# Patient Record
Sex: FEMALE | Race: BLACK OR AFRICAN AMERICAN | Employment: OTHER | URBAN - METROPOLITAN AREA
[De-identification: names, ages, dates, MRNs, and addresses within clinical notes are randomized per-mention and may not be internally consistent; named-entity substitution may affect disease eponyms.]

---

## 2024-11-12 ENCOUNTER — HOSPITAL ENCOUNTER (OUTPATIENT)
Dept: CT IMAGING | Facility: HOSPITAL | Age: 89
Discharge: HOME | End: 2024-11-12
Attending: FAMILY MEDICINE
Payer: MEDICARE

## 2024-11-12 DIAGNOSIS — K76.89 LIVER NODULE: ICD-10-CM

## 2024-11-12 LAB
CREAT BLD-MCNC: 1.3 MG/DL
EGFRCR SERPLBLD CKD-EPI 2021: 40 ML/MIN/1.73M2 (ref 60–?)

## 2024-11-12 PROCEDURE — 82565 ASSAY OF CREATININE: CPT

## 2024-11-12 PROCEDURE — 74160 CT ABDOMEN W/CONTRAST: CPT | Performed by: FAMILY MEDICINE

## 2025-03-24 ENCOUNTER — OFFICE VISIT (OUTPATIENT)
Dept: PULMONOLOGY | Facility: CLINIC | Age: 89
End: 2025-03-24

## 2025-03-24 VITALS
WEIGHT: 119 LBS | BODY MASS INDEX: 16.3 KG/M2 | HEIGHT: 71.5 IN | SYSTOLIC BLOOD PRESSURE: 114 MMHG | HEART RATE: 89 BPM | OXYGEN SATURATION: 97 % | DIASTOLIC BLOOD PRESSURE: 60 MMHG

## 2025-03-24 DIAGNOSIS — R91.1 LUNG NODULE: ICD-10-CM

## 2025-03-24 DIAGNOSIS — J44.9 CHRONIC OBSTRUCTIVE PULMONARY DISEASE, UNSPECIFIED COPD TYPE (HCC): Primary | ICD-10-CM

## 2025-03-24 PROBLEM — N18.30 CKD (CHRONIC KIDNEY DISEASE) STAGE 3, GFR 30-59 ML/MIN (HCC): Chronic | Status: ACTIVE | Noted: 2025-03-24

## 2025-03-24 PROCEDURE — 1159F MED LIST DOCD IN RCRD: CPT | Performed by: INTERNAL MEDICINE

## 2025-03-24 PROCEDURE — 1160F RVW MEDS BY RX/DR IN RCRD: CPT | Performed by: INTERNAL MEDICINE

## 2025-03-24 PROCEDURE — 99204 OFFICE O/P NEW MOD 45 MIN: CPT | Performed by: INTERNAL MEDICINE

## 2025-03-24 PROCEDURE — 3074F SYST BP LT 130 MM HG: CPT | Performed by: INTERNAL MEDICINE

## 2025-03-24 PROCEDURE — 1125F AMNT PAIN NOTED PAIN PRSNT: CPT | Performed by: INTERNAL MEDICINE

## 2025-03-24 PROCEDURE — 3078F DIAST BP <80 MM HG: CPT | Performed by: INTERNAL MEDICINE

## 2025-03-24 PROCEDURE — 3008F BODY MASS INDEX DOCD: CPT | Performed by: INTERNAL MEDICINE

## 2025-03-24 RX ORDER — AZITHROMYCIN 250 MG/1
250 TABLET, FILM COATED ORAL DAILY
COMMUNITY

## 2025-03-24 RX ORDER — MEMANTINE HYDROCHLORIDE 14 MG/1
1 CAPSULE, EXTENDED RELEASE ORAL DAILY
COMMUNITY

## 2025-03-24 RX ORDER — LISINOPRIL 5 MG/1
5 TABLET ORAL DAILY
COMMUNITY

## 2025-03-24 RX ORDER — IPRATROPIUM BROMIDE AND ALBUTEROL SULFATE 2.5; .5 MG/3ML; MG/3ML
3 SOLUTION RESPIRATORY (INHALATION) EVERY 12 HOURS
Qty: 120 ML | Refills: 1 | Status: SHIPPED | OUTPATIENT
Start: 2025-03-24

## 2025-03-24 RX ORDER — SERTRALINE HYDROCHLORIDE 25 MG/1
25 TABLET, FILM COATED ORAL DAILY
COMMUNITY

## 2025-03-24 RX ORDER — ALBUTEROL SULFATE 90 UG/1
1 INHALANT RESPIRATORY (INHALATION) EVERY 6 HOURS PRN
COMMUNITY

## 2025-03-24 RX ORDER — FLUTICASONE FUROATE, UMECLIDINIUM BROMIDE AND VILANTEROL TRIFENATATE 100; 62.5; 25 UG/1; UG/1; UG/1
1 POWDER RESPIRATORY (INHALATION) DAILY
Qty: 1 EACH | Refills: 5 | Status: SHIPPED | OUTPATIENT
Start: 2025-03-24

## 2025-03-24 NOTE — PROGRESS NOTES
Signed RUSSELL faxed to [facility] (fx# [phone]) requesting report of recent PET scan. RUSSELL sent to scanning.    Order for nebulizer machine and supplies submitted to [facility] via [place].

## 2025-03-24 NOTE — H&P
[facility]    Pulmonary consult     Alicia Fuentes Patient Status:  Specimen    1935 MRN [id number]   Location [facility] Attending No att. providers found   Hosp Day # 0 PCP Amelie Martinez DO     Date:  3/24/2025    History provided by:patient and daughter  HPI:     Chief Complaint   Patient presents with    Consult     Patient has had cough for 2 weeks     HPI    89-year-old female with history of COPD, lung cancer NSCCA RUL s/p lobectomy in  followed by chemo and radiation therapy and she underwent bronchoscopy with EBUS in  and it was negative after chest CT showed a mass  CT-guided biopsy in  reported negative and completed SBRT in 2019   Followed by pulmonary and oncology at [place] with complex lung disease and many follow-up CTs  She had chest CT in 2024 with also suspicious nodule in the left lung  Patient had recent PET scan outside facility reported negative and is not available to me.    Patient also with progressive dementia, COPD, hypertension, failure to thrive  Came with her daughter with very poor functional status  Last 100 pound in the last 2 years  No dyspnea at rest  Chronic cough and occasional wheezes  No hemoptysis  No fever or chills  Dyspnea upon exertion still able to perform some activity of daily living  Denied abdominal pain or vomiting or diarrhea      History   COPD  Lung cancer    Alzheimer dementia  Hypertension    Social History:  Social History     Socioeconomic History    Marital status:      Social Drivers of Health      Received from [facility]    Housing Stability     Allergies/Medications:   Allergies: Allergies[1]  (Not in a hospital admission)      Review of Systems:     Constitutional:  Positive for fatigue and unexpected weight change. Negative for fever.   HENT:  Negative for congestion.    Respiratory:  Positive for  cough, shortness of breath and wheezing.    Cardiovascular:  Negative for chest pain, palpitations and leg swelling.   Gastrointestinal:  Negative for abdominal distention.   Musculoskeletal:  Positive for back pain.   Skin: Negative.    Neurological:  Negative for seizures.   Psychiatric/Behavioral:  Negative for agitation.        Physical Exam:   Vital Signs:  Blood pressure 114/60, pulse 89, height 5' 11.5\" (1.816 m), weight 119 lb (54 kg), SpO2 97%.  Physical Exam  Constitutional:       General: She is not in acute distress.     Appearance: She is ill-appearing.   HENT:      Head: Atraumatic.      Nose: Nose normal.      Mouth/Throat:      Mouth: Mucous membranes are moist.   Eyes:      General: No scleral icterus.  Cardiovascular:      Rate and Rhythm: Normal rate.      Heart sounds:      No gallop.   Pulmonary:      Comments: Good air exchange to both lungs  No rales or rhonchi  Few expiratory wheezes in the bases especially during cough  Abdominal:      General: Abdomen is flat. Bowel sounds are normal.      Palpations: Abdomen is soft.      Tenderness: There is no guarding.   Musculoskeletal:      Right lower leg: No edema.      Left lower leg: No edema.   Lymphadenopathy:      Cervical: No cervical adenopathy.   Skin:     General: Skin is dry.   Neurological:      General: No focal deficit present.      Mental Status: She is oriented to person, place, and time.           Results:     Extensive review of all the records from multiple outside facilities reviewed    Chest ct 8/9/2024 at [facility]    Lungs and large airways: Upper Central airways are patent. There are   postsurgical/post radiation changes to the right upper lobe and around the right   bronchus. Increased opacification at the medial right lung apex and extending   inferiorly with volume loss likely reflects post treatment changes. There is a   1.3 x 1.3 cm spiculated left lower lobe nodule (3/55). Additional irregular   groundglass  opacity in the right lower lobe measuring up to 6 mm. Right lower   lobe atelectatic/fibrotic changes.     Pleura:  No pleural effusion or pneumothorax.     Mediastinum and etienne: No mediastinal or hilar lymphadenopathy.     Heart and great vessels: Mild cardiomegaly with a small to moderate size   pericardial effusion.     Chest wall, lower neck, axillae: No axillary lymphadenopathy.     Visualized Upper abdomen:  There is of a 1.6 cm partially rim enhancing   nodularity at the posterior caudal aspect of the right lobe of the liver and a 6   mm hyperenhancing nodularity the lateral aspect of the right lobe of the liver.     Bones: No destructive osseous lesions.     IMPRESSION:     Posttreatment changes are noted at the medial right lung apex, likely prior   neoplasm. There is a spiculated left lower lobe nodule which is highly   suspicious for malignancy. Recommend further evaluation with PET/CT and/or   tissue sampling.     Incidental note of a 1.6 cm partially rim enhancing nodularity at the posterior   caudal aspect of the right lobe of the liver and a 6 mm hyperenhancing   nodularity at the lateral aspect of the liver which are inadequately   characterized on this single phase exam. Consideration for dedicated liver   protocol contrast enhanced CT or MRI may be of additional diagnostic value to   characterize these lesions.     Small to moderate-sized pericardial effusion.       Assessment/Plan:       1-history of lung cancer / NSCCA   - RUL stage IIa s/p RUL lobectomy followed by radiation therapy in 2001  -Suspicious mass and lymphadenopathy with negative bronchoscopy and EBUS 4R and 11L in 2018  - Completed SBRT in March 2019 for a lung mass  Reported stable CT in 2020  Reported suspicious nodule left lower lobe 2024  Recent PET scan reported negative and not available to me    Poor functional status with failure to thrive and 100 pound weight loss  Not a candidate for further aggressive procedure or  biopsies and patient and daughters agreeable    Plan ;  Obtain report of last PET scan was done recently  Will recommend continue follow-up with her oncologist at [place]    2-COPD  40-pack-year history of smoking quit in 2001  Symptomatic with cough and dyspnea upon exertion    Plan ;  PFTs  Home nebulizers with DuoNeb every 12 hours as needed  Add Trelegy inhaler    3-dementia  Supportive care    4-failure to thrive and significant cachexia and weight loss  Nutrition support      D/w pt and daughter   F/u in 8 weeks               Aldair Kong MD  3/24/2025         [1] Not on File

## 2025-07-21 DIAGNOSIS — M25.562 LEFT KNEE PAIN, UNSPECIFIED CHRONICITY: Primary | ICD-10-CM
